# Patient Record
(demographics unavailable — no encounter records)

---

## 2025-02-14 NOTE — DISCUSSION/SUMMARY
[FreeTextEntry1] : Fever Headache  Plan POCT flu; negative TC to Mom Supportive care discussed. Increase PO fluids and rest Fever management and infection control discussed

## 2025-02-14 NOTE — HISTORY OF PRESENT ILLNESS
[de-identified] : sick [FreeTextEntry6] : 13 year old here with fever and headache  HPI: States he vomited in the middle of the night  No sore throat , nasal congestion or cough No diarrhea or abdominal pain  Has not eaten today Headache is 5/10 No one at home is ill

## 2025-05-14 NOTE — HISTORY OF PRESENT ILLNESS
[Yes] : Patient goes to dentist yearly [Toothpaste] : Primary Fluoride Source: Toothpaste [Needs Immunizations] : Needs immunizations [Eats meals with family] : eats meals with family [Has family members/adults to turn to for help] : has family members/adults to turn to for help [Is permitted and is able to make independent decisions] : Is permitted and is able to make independent decisions [Sleep Concerns] : no sleep concerns [Grade: ____] : Grade: [unfilled] [Normal Performance] : normal performance [Normal Behavior/Attention] : normal behavior/attention [Normal Homework] : normal homework [Eats regular meals including adequate fruits and vegetables] : eats regular meals including adequate fruits and vegetables [Drinks non-sweetened liquids] : drinks non-sweetened liquids  [Calcium source] : calcium source [Has concerns about body or appearance] : does not have concerns about body or appearance [Has friends] : has friends [At least 1 hour of physical activity a day] : at least 1 hour of physical activity a day [Screen time (except homework) less than 2 hours a day] : no screen time (except homework) less than 2 hours a day [Has interests/participates in community activities/volunteers] : does not have interests/participates in community activities/volunteers [Uses electronic nicotine delivery system] : does not use electronic nicotine delivery system [Exposure to electronic nicotine delivery system] : no exposure to electronic nicotine delivery system [Uses tobacco] : does not use tobacco [Exposure to tobacco] : no exposure to tobacco [Uses drugs] : does not use drugs  [Exposure to drugs] : no exposure to drugs [Drinks alcohol] : does not drink alcohol [Exposure to alcohol] : no exposure to alcohol [No] : No cigarette smoke exposure [Uses safety belts/safety equipment] : uses safety belts/safety equipment  [Has ways to cope with stress] : has ways to cope with stress [Displays self-confidence] : displays self-confidence [Has problems with sleep] : does not have problems with sleep [Gets depressed, anxious, or irritable/has mood swings] : does not get depressed, anxious, or irritable/has mood swings [Has thought about hurting self or considered suicide] : has not thought about hurting self or considered suicide [With Teen] : teen [de-identified] : Likes to play basketball and football [FreeTextEntry1] : 14 year old male here for well child exam  HPI: He is feeling well today  PMH: no significant past medical history  Allergies: tree nuts (eats peanut butter )  Ed: 8th grade in [employer]; good student Activity: likes to play basketball; video games No substance use Never sexually active No feelings of sadness or anxiety

## 2025-05-14 NOTE — RISK ASSESSMENT
[Little interest or pleasure doing things] : 1) Little interest or pleasure doing things [Feeling down, depressed, or hopeless] : 2) Feeling down, depressed, or hopeless [0] : 2) Feeling down, depressed, or hopeless: Not at all (0) [PHQ-2 Negative - No further assessment needed] : PHQ-2 Negative - No further assessment needed [[id number]] : 0

## 2025-05-14 NOTE — PHYSICAL EXAM

## 2025-05-14 NOTE — DISCUSSION/SUMMARY
[Normal Growth] : growth [Normal Development] : development  [No Elimination Concerns] : elimination [Continue Regimen] : feeding [No Skin Concerns] : skin [Normal Sleep Pattern] : sleep [None] : no medical problems [Anticipatory Guidance Given] : Anticipatory guidance addressed as per the history of present illness section [Physical Growth and Development] : physical growth and development [Social and Academic Competence] : social and academic competence [Emotional Well-Being] : emotional well-being [Risk Reduction] : risk reduction [Violence and Injury Prevention] : violence and injury prevention [No Vaccines] : no vaccines needed [No Medications] : ~He/She~ is not on any medications [Patient] : patient [Parent/Guardian] : Parent/Guardian [FreeTextEntry1] : Well adolescent - BMI 98th % - Mild acne Plan Attempted to call Mom but could not reach her - Acne care discussed and handout reviewed  and given - Discussed pubertal changes, dental hygiene, importance of getting enough sleep exercise, healthy eating/snack, portion sizes no sugar drinks.  - Discussed the importance of avoiding vaping/cigarettes.  - Vaccines needed. VIS and consent sent home - Discussed the services offered by the [facility]. - Infection prevention discussed. - Discussed safe sex, condom use. Routine dental care